# Patient Record
Sex: MALE | Race: WHITE | Employment: FULL TIME | URBAN - NONMETROPOLITAN AREA
[De-identification: names, ages, dates, MRNs, and addresses within clinical notes are randomized per-mention and may not be internally consistent; named-entity substitution may affect disease eponyms.]

---

## 2017-09-26 ENCOUNTER — APPOINTMENT (OUTPATIENT)
Dept: GENERAL RADIOLOGY | Age: 37
End: 2017-09-26
Payer: COMMERCIAL

## 2017-09-26 ENCOUNTER — HOSPITAL ENCOUNTER (EMERGENCY)
Age: 37
Discharge: HOME | End: 2017-09-26
Payer: COMMERCIAL

## 2017-09-26 VITALS
HEIGHT: 70 IN | BODY MASS INDEX: 27.92 KG/M2 | WEIGHT: 195 LBS | TEMPERATURE: 98.4 F | RESPIRATION RATE: 18 BRPM | HEART RATE: 95 BPM | SYSTOLIC BLOOD PRESSURE: 139 MMHG | OXYGEN SATURATION: 94 % | DIASTOLIC BLOOD PRESSURE: 86 MMHG
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
## 2017-09-26 DIAGNOSIS — S93.401A SPRAIN OF RIGHT ANKLE, UNSPECIFIED LIGAMENT, INITIAL ENCOUNTER: ICD-10-CM

## 2017-09-26 PROCEDURE — 99283 EMERGENCY DEPT VISIT LOW MDM: CPT | Performed by: NURSE PRACTITIONER

## 2017-09-26 PROCEDURE — 99284 EMERGENCY DEPT VISIT MOD MDM: CPT

## 2017-09-26 PROCEDURE — 73610 X-RAY EXAM OF ANKLE: CPT

## 2017-09-26 RX ORDER — NAPROXEN 500 MG/1
500 TABLET ORAL 2 TIMES DAILY
Qty: 60 TABLET | Refills: 0 | Status: SHIPPED | OUTPATIENT
Start: 2017-09-26

## 2017-09-26 ASSESSMENT — PAIN DESCRIPTION - DESCRIPTORS: DESCRIPTORS: THROBBING

## 2017-09-26 ASSESSMENT — PAIN DESCRIPTION - PAIN TYPE: TYPE: ACUTE PAIN

## 2017-09-26 ASSESSMENT — PAIN DESCRIPTION - LOCATION: LOCATION: ANKLE

## 2017-09-26 ASSESSMENT — ENCOUNTER SYMPTOMS: BACK PAIN: 0

## 2017-09-26 ASSESSMENT — PAIN DESCRIPTION - ORIENTATION: ORIENTATION: RIGHT

## 2017-09-26 ASSESSMENT — PAIN SCALES - GENERAL: PAINLEVEL_OUTOF10: 4

## 2019-02-11 ENCOUNTER — OFFICE VISIT (OUTPATIENT)
Dept: INTERNAL MEDICINE | Facility: CLINIC | Age: 39
End: 2019-02-11

## 2019-02-11 VITALS
WEIGHT: 182.25 LBS | DIASTOLIC BLOOD PRESSURE: 94 MMHG | OXYGEN SATURATION: 99 % | HEIGHT: 70 IN | HEART RATE: 94 BPM | TEMPERATURE: 98.6 F | RESPIRATION RATE: 12 BRPM | SYSTOLIC BLOOD PRESSURE: 124 MMHG | BODY MASS INDEX: 26.09 KG/M2

## 2019-02-11 DIAGNOSIS — N52.8 OTHER MALE ERECTILE DYSFUNCTION: Primary | ICD-10-CM

## 2019-02-11 DIAGNOSIS — R03.0 ELEVATED BLOOD PRESSURE READING: ICD-10-CM

## 2019-02-11 DIAGNOSIS — Z13.220 SCREENING FOR LIPID DISORDERS: ICD-10-CM

## 2019-02-11 PROCEDURE — 99204 OFFICE O/P NEW MOD 45 MIN: CPT | Performed by: FAMILY MEDICINE

## 2019-02-11 RX ORDER — SILDENAFIL 50 MG/1
50 TABLET, FILM COATED ORAL DAILY PRN
Qty: 2 TABLET | Refills: 0 | Status: SHIPPED | OUTPATIENT
Start: 2019-02-11

## 2019-02-11 RX ORDER — MULTIPLE VITAMINS W/ MINERALS TAB 9MG-400MCG
1 TAB ORAL DAILY
COMMUNITY

## 2019-02-11 RX ORDER — ASPIRIN 81 MG/1
81 TABLET ORAL DAILY
COMMUNITY

## 2019-02-11 NOTE — PATIENT INSTRUCTIONS
Get labs at 8 AM if possible, no calories for 8 hrs prior.     Check blood pressure out of office and keep log. Goal less than 140/90.     See me back in 4 weeks.

## 2019-02-11 NOTE — ASSESSMENT & PLAN NOTE
Broad differential: Vasculogenic, psychological, heavy alcohol use, hypogonadism.  -Labs below  -Reduce alcohol use  -Reduce tobacco  -Viagra trial  -Follow-up 4 weeks

## 2019-02-11 NOTE — PROGRESS NOTES
CC:   Chief Complaint   Patient presents with   • Establish Care       History:  Max Fitzgerald is a 38 y.o. male who presents today for evaluation of the above problems.      More frequent ED in the past 2-3 weeks, happened in the past, also notes that he will go through periods of low libido, then it will return. Currently more stressed, finalizing a divorce after 12 year marriage. Occasional bouts of insomnia, has some anxiety at times.     Is on track for tenure, teaches logistics and supply chain management.    Chews tobacco twice per night.  Uses for 20 years.    Mother and brother has sleep apnea, he snores a little, recently lost 15-20 lbs. Watches netflix at night.    Drinks 2-4 drinks 3-4 nights per week.     ROS:  Review of Systems   Constitutional: Negative for chills, fatigue and fever.   HENT: Negative for congestion, rhinorrhea and sore throat.    Eyes: Negative for visual disturbance.   Respiratory: Negative for shortness of breath.    Cardiovascular: Negative for chest pain.   Gastrointestinal: Negative for abdominal pain, constipation, diarrhea, nausea and vomiting.   Endocrine: Negative for polydipsia and polyuria.   Genitourinary: Negative for difficulty urinating.   Musculoskeletal: Negative for arthralgias and myalgias.   Skin: Negative for rash.   Allergic/Immunologic: Negative.    Neurological: Negative for dizziness, light-headedness, numbness and headaches.   Hematological: Negative for adenopathy.   Psychiatric/Behavioral: Negative for behavioral problems, decreased concentration and sleep disturbance. The patient is nervous/anxious.        No Known Allergies  History reviewed. No pertinent past medical history.  Past Surgical History:   Procedure Laterality Date   • AXILLARY LYMPH NODE BIOPSY/EXCISION Left 1988   • FRACTURE SURGERY Right 1989   • HAND SURGERY Right 1996     Family History   Problem Relation Age of Onset   • Heart disease Mother    • Alcohol abuse Father    • Cancer Father   "  • Alcohol abuse Brother    • Heart disease Brother    • Alcohol abuse Maternal Grandmother    • Alcohol abuse Paternal Grandmother    • Alcohol abuse Paternal Grandfather       reports that  has never smoked. His smokeless tobacco use includes chew. He reports that he drinks alcohol. He reports that he does not use drugs.      Current Outpatient Medications:   •  aspirin 81 MG EC tablet, Take 81 mg by mouth Daily., Disp: , Rfl:   •  Multiple Vitamins-Minerals (MULTIVITAMIN WITH MINERALS) tablet tablet, Take 1 tablet by mouth Daily., Disp: , Rfl:   •  sildenafil (VIAGRA) 50 MG tablet, Take 1 tablet by mouth Daily As Needed for erectile dysfunction., Disp: 2 tablet, Rfl: 0    OBJECTIVE:  /94 (BP Location: Left arm, Patient Position: Sitting, Cuff Size: Adult)   Pulse 94   Temp 98.6 °F (37 °C) (Temporal)   Resp 12   Ht 176.5 cm (69.5\")   Wt 82.7 kg (182 lb 4 oz)   SpO2 99%   BMI 26.53 kg/m²      Physical Exam   Constitutional: He is oriented to person, place, and time. No distress.   HENT:   Head: Normocephalic and atraumatic.   Nose: Nose normal.   Eyes: Conjunctivae are normal. Right eye exhibits no discharge. Left eye exhibits no discharge. No scleral icterus.   Neck: Neck supple. No tracheal deviation present. No thyromegaly present.   Cardiovascular: Normal rate, regular rhythm and normal heart sounds. Exam reveals no gallop and no friction rub.   No murmur heard.  Pulmonary/Chest: Effort normal and breath sounds normal. No respiratory distress. He has no wheezes. He has no rales.   Abdominal: Soft. Bowel sounds are normal. There is no tenderness.   Neurological: He is alert and oriented to person, place, and time.   Skin: Skin is warm and dry. He is not diaphoretic. No pallor.   Psychiatric: He has a normal mood and affect. His behavior is normal. Judgment and thought content normal.   Nursing note and vitals reviewed.    Assessment/Plan    Problem List Items Addressed This Visit        " Cardiovascular and Mediastinum    Elevated blood pressure reading     Home BP monitoring, goal < 140/90            Other    Other male erectile dysfunction - Primary     Broad differential: Vasculogenic, psychological, heavy alcohol use, hypogonadism.  -Labs below  -Reduce alcohol use  -Reduce tobacco  -Viagra trial  -Follow-up 4 weeks         Relevant Medications    sildenafil (VIAGRA) 50 MG tablet    Other Relevant Orders    Lipid panel    Testosterone    Comprehensive Metabolic Panel      Other Visit Diagnoses     Screening for lipid disorders        Relevant Orders    Lipid panel          Patient's Body mass index is 26.53 kg/m². BMI is above normal parameters. Recommendations include: exercise counseling.    An After Visit Summary was printed and given to the patient at discharge.  No Follow-up on file.         Mehul Owen D.O.  [facility]  Osteopathic Neuromusculoskeletal Medicine  2/11/2019

## 2019-02-15 ENCOUNTER — LAB (OUTPATIENT)
Dept: LAB | Facility: HOSPITAL | Age: 39
End: 2019-02-15

## 2019-02-15 DIAGNOSIS — N52.8 OTHER MALE ERECTILE DYSFUNCTION: ICD-10-CM

## 2019-02-15 DIAGNOSIS — Z13.220 SCREENING FOR LIPID DISORDERS: ICD-10-CM

## 2019-02-15 LAB
ALBUMIN SERPL-MCNC: 4.5 G/DL (ref 3.5–5)
ALBUMIN/GLOB SERPL: 1.7 G/DL (ref 1.1–2.5)
ALP SERPL-CCNC: 90 U/L (ref 24–120)
ALT SERPL W P-5'-P-CCNC: 27 U/L (ref 0–54)
ANION GAP SERPL CALCULATED.3IONS-SCNC: 8 MMOL/L (ref 4–13)
ARTICHOKE IGE QN: 63 MG/DL (ref 0–99)
AST SERPL-CCNC: 30 U/L (ref 7–45)
BILIRUB SERPL-MCNC: 1.1 MG/DL (ref 0.1–1)
BUN BLD-MCNC: 13 MG/DL (ref 5–21)
BUN/CREAT SERPL: 18.8 (ref 7–25)
CALCIUM SPEC-SCNC: 9.3 MG/DL (ref 8.4–10.4)
CHLORIDE SERPL-SCNC: 100 MMOL/L (ref 98–110)
CHOLEST SERPL-MCNC: 135 MG/DL (ref 130–200)
CO2 SERPL-SCNC: 30 MMOL/L (ref 24–31)
CREAT BLD-MCNC: 0.69 MG/DL (ref 0.5–1.4)
GFR SERPL CREATININE-BSD FRML MDRD: 128 ML/MIN/1.73
GLOBULIN UR ELPH-MCNC: 2.6 GM/DL
GLUCOSE BLD-MCNC: 93 MG/DL (ref 70–100)
HDLC SERPL-MCNC: 56 MG/DL
LDLC/HDLC SERPL: 0.98 {RATIO}
POTASSIUM BLD-SCNC: 4.3 MMOL/L (ref 3.5–5.3)
PROT SERPL-MCNC: 7.1 G/DL (ref 6.3–8.7)
SODIUM BLD-SCNC: 138 MMOL/L (ref 135–145)
TRIGL SERPL-MCNC: 122 MG/DL (ref 0–149)

## 2019-02-15 PROCEDURE — 80053 COMPREHEN METABOLIC PANEL: CPT | Performed by: FAMILY MEDICINE

## 2019-02-15 PROCEDURE — 80061 LIPID PANEL: CPT | Performed by: FAMILY MEDICINE

## 2019-02-15 PROCEDURE — 84403 ASSAY OF TOTAL TESTOSTERONE: CPT | Performed by: FAMILY MEDICINE

## 2019-02-15 PROCEDURE — 36415 COLL VENOUS BLD VENIPUNCTURE: CPT

## 2019-02-16 LAB — TESTOST SERPL-MCNC: 296 NG/DL (ref 264–916)

## 2019-03-11 ENCOUNTER — OFFICE VISIT (OUTPATIENT)
Dept: INTERNAL MEDICINE | Facility: CLINIC | Age: 39
End: 2019-03-11

## 2019-03-11 VITALS
SYSTOLIC BLOOD PRESSURE: 130 MMHG | HEART RATE: 84 BPM | WEIGHT: 185.9 LBS | HEIGHT: 70 IN | TEMPERATURE: 98.1 F | RESPIRATION RATE: 16 BRPM | DIASTOLIC BLOOD PRESSURE: 100 MMHG | OXYGEN SATURATION: 99 % | BODY MASS INDEX: 26.61 KG/M2

## 2019-03-11 DIAGNOSIS — E66.3 OVERWEIGHT (BMI 25.0-29.9): ICD-10-CM

## 2019-03-11 DIAGNOSIS — N52.8 OTHER MALE ERECTILE DYSFUNCTION: Primary | ICD-10-CM

## 2019-03-11 DIAGNOSIS — Z87.898 HISTORY OF HEAVY ALCOHOL CONSUMPTION: ICD-10-CM

## 2019-03-11 DIAGNOSIS — R03.0 WHITE COAT SYNDROME WITHOUT DIAGNOSIS OF HYPERTENSION: ICD-10-CM

## 2019-03-11 PROCEDURE — 99214 OFFICE O/P EST MOD 30 MIN: CPT | Performed by: FAMILY MEDICINE

## 2019-03-11 NOTE — ASSESSMENT & PLAN NOTE
Currently has good insight and is working on lowering carbs and eating adequate protein diet with less alcohol and evenings.  Follow-up in 3 months

## 2019-03-11 NOTE — PROGRESS NOTES
"CC:   Chief Complaint   Patient presents with   • Follow-up       History:  Max Fitzgerald is a 38 y.o. male who presents today for follow-up for evaluation of the above:    Here for follow up of ED, started viagra last visit. Labs revealed testosterone of 296, normal lipid panel, CMP with bilirubin of 1.1 otherwise normal. Viagra is working well.     BP elevated today, says that it is 120/80 if he takes it in the afternoon at [employer].     Gave up ETOH at night for Lent, says that he feels better overall. Reading instead of Netflix prior to sleep.  He is working on lowering carbohydrates to improve his diet.  He also reports that he has a history of heavy alcohol use in the 1990s, as well as when he was in the , and did previously abuse alcohol but has been using it in moderation for the past 5-6 years.  Alcoholism runs in multiple family members including his father, however he denies that he is an alcoholic.  He has been to AA meetings, currently does not feel that he needs that for support to maintain sobriety.     ROS:  Review of Systems   Constitutional: Negative for activity change and fatigue.   Genitourinary:        ED improved   Psychiatric/Behavioral: Negative for behavioral problems and sleep disturbance.       Mr. Fitzgerald  reports that  has never smoked. His smokeless tobacco use includes chew. He reports that he drinks alcohol. He reports that he does not use drugs.      Current Outpatient Medications:   •  aspirin 81 MG EC tablet, Take 81 mg by mouth Daily., Disp: , Rfl:   •  Multiple Vitamins-Minerals (MULTIVITAMIN WITH MINERALS) tablet tablet, Take 1 tablet by mouth Daily., Disp: , Rfl:   •  sildenafil (VIAGRA) 50 MG tablet, Take 1 tablet by mouth Daily As Needed for erectile dysfunction., Disp: 2 tablet, Rfl: 0      OBJECTIVE:  /100 (BP Location: Right arm, Patient Position: Sitting, Cuff Size: Adult)   Pulse 84   Temp 98.1 °F (36.7 °C) (Oral)   Resp 16   Ht 176.5 cm (69.5\")   Wt " 84.3 kg (185 lb 14.4 oz)   SpO2 99%   BMI 27.06 kg/m²    Physical Exam   Constitutional: He is oriented to person, place, and time. No distress.   HENT:   Head: Normocephalic and atraumatic.   Nose: Nose normal.   Eyes: Conjunctivae are normal. Right eye exhibits no discharge. Left eye exhibits no discharge. No scleral icterus.   Neck: No tracheal deviation present.   Cardiovascular: Normal rate, regular rhythm and normal heart sounds. Exam reveals no gallop and no friction rub.   No murmur heard.  Pulmonary/Chest: Effort normal and breath sounds normal. No respiratory distress. He has no wheezes. He has no rales.   Neurological: He is alert and oriented to person, place, and time.   Skin: Skin is warm and dry. He is not diaphoretic. No pallor.   Psychiatric: He has a normal mood and affect. His behavior is normal. Judgment and thought content normal.   Insightful   Nursing note and vitals reviewed.    Assessment/Plan    Problem List Items Addressed This Visit        Other    Other male erectile dysfunction - Primary     Improved with Viagra, however patient would like to improve symptoms with diet and exercise.  See plan elsewhere, will refill Viagra as needed and follow-up in 3 months.         White coat syndrome without diagnosis of hypertension     Continue home blood pressure monitoring for goal of less than 140/90         Overweight (BMI 25.0-29.9)     Currently has good insight and is working on lowering carbs and eating adequate protein diet with less alcohol and evenings.  Follow-up in 3 months         History of heavy alcohol consumption     History of heavy alcohol use and famHx of alcoholism and abuse with moderate use for several years, currently sober without symptoms completely diagnostic of alcoholism.   -continue to monitor for dependence  -pt does not feel that he needs AA or other support at this time  -readdress at follow up               Patient's Body mass index is 27.06 kg/m². BMI is above  normal parameters. Recommendations include: nutrition counseling.      An After Visit Summary was printed and given to the patient at discharge.  Return in about 3 months (around 6/11/2019). Sooner if problems arise.         Mehul Owen D.O.  Family Medicine  Osteopathic Neuromusculoskeletal Medicine

## 2019-03-11 NOTE — ASSESSMENT & PLAN NOTE
History of heavy alcohol use and famHx of alcoholism and abuse with moderate use for several years, currently sober without symptoms completely diagnostic of alcoholism.   -continue to monitor for dependence  -pt does not feel that he needs AA or other support at this time  -readdress at follow up

## 2019-03-11 NOTE — ASSESSMENT & PLAN NOTE
Improved with Viagra, however patient would like to improve symptoms with diet and exercise.  See plan elsewhere, will refill Viagra as needed and follow-up in 3 months.